# Patient Record
Sex: MALE | Race: WHITE | Employment: OTHER | URBAN - METROPOLITAN AREA
[De-identification: names, ages, dates, MRNs, and addresses within clinical notes are randomized per-mention and may not be internally consistent; named-entity substitution may affect disease eponyms.]

---

## 2021-10-01 ENCOUNTER — OFFICE VISIT (OUTPATIENT)
Dept: SURGERY | Age: 76
End: 2021-10-01
Payer: MEDICARE

## 2021-10-01 VITALS
OXYGEN SATURATION: 98 % | SYSTOLIC BLOOD PRESSURE: 136 MMHG | HEART RATE: 75 BPM | HEIGHT: 71 IN | TEMPERATURE: 97.3 F | DIASTOLIC BLOOD PRESSURE: 82 MMHG | WEIGHT: 213.5 LBS | BODY MASS INDEX: 29.89 KG/M2

## 2021-10-01 DIAGNOSIS — L02.91 ABSCESS: Primary | ICD-10-CM

## 2021-10-01 PROCEDURE — 10060 I&D ABSCESS SIMPLE/SINGLE: CPT | Performed by: PHYSICIAN ASSISTANT

## 2021-10-01 PROCEDURE — G8484 FLU IMMUNIZE NO ADMIN: HCPCS | Performed by: PHYSICIAN ASSISTANT

## 2021-10-01 PROCEDURE — G8417 CALC BMI ABV UP PARAM F/U: HCPCS | Performed by: PHYSICIAN ASSISTANT

## 2021-10-01 PROCEDURE — G8427 DOCREV CUR MEDS BY ELIG CLIN: HCPCS | Performed by: PHYSICIAN ASSISTANT

## 2021-10-01 PROCEDURE — 3017F COLORECTAL CA SCREEN DOC REV: CPT | Performed by: PHYSICIAN ASSISTANT

## 2021-10-01 PROCEDURE — 4040F PNEUMOC VAC/ADMIN/RCVD: CPT | Performed by: PHYSICIAN ASSISTANT

## 2021-10-01 PROCEDURE — 4004F PT TOBACCO SCREEN RCVD TLK: CPT | Performed by: PHYSICIAN ASSISTANT

## 2021-10-01 PROCEDURE — 1123F ACP DISCUSS/DSCN MKR DOCD: CPT | Performed by: PHYSICIAN ASSISTANT

## 2021-10-01 PROCEDURE — 99203 OFFICE O/P NEW LOW 30 MIN: CPT | Performed by: PHYSICIAN ASSISTANT

## 2021-10-01 RX ORDER — LIDOCAINE HYDROCHLORIDE AND EPINEPHRINE 10; 10 MG/ML; UG/ML
3 INJECTION, SOLUTION INFILTRATION; PERINEURAL ONCE
Status: COMPLETED | OUTPATIENT
Start: 2021-10-01 | End: 2021-10-01

## 2021-10-01 RX ORDER — SULFAMETHOXAZOLE AND TRIMETHOPRIM 800; 160 MG/1; MG/1
1 TABLET ORAL 2 TIMES DAILY
Qty: 20 TABLET | Refills: 0 | Status: SHIPPED
Start: 2021-10-01 | End: 2021-10-03 | Stop reason: ALTCHOICE

## 2021-10-01 RX ADMIN — LIDOCAINE HYDROCHLORIDE AND EPINEPHRINE 3 ML: 10; 10 INJECTION, SOLUTION INFILTRATION; PERINEURAL at 09:22

## 2021-10-01 NOTE — PROGRESS NOTES
Department of Plastic Surgery - Adult  Attending Consult Note      CHIEF COMPLAINT: Abscess left posterior neck    History Obtained From:  patient    HISTORY OF PRESENT ILLNESS:                The patient is a 77 y.o. male who presents with left posterior neck abscess. Patient states that he has had this mass for several weeks however became more painful and tender when he went to a provider in the state of [place] as he was [address]. This was 1 week prior where the abscess was incised and drained. He returned to the clinic at that time 2 days later where he was given Keflex for 7 days. He states he is just about finished with the antibiotics. He states that he believes he had the area cultured but did not have the results. He states that the abscess has reformed and is causing increased pain and tenderness.   He denies any recent discharge or drainage      Past Medical History:    Past Medical History:   Diagnosis Date    Diabetes mellitus ([address]     GERD (gastroesophageal reflux disease)     Hyperlipidemia     Hypertension     Kidney stones      Past Surgical History:    Past Surgical History:   Procedure Laterality Date    HERNIA REPAIR      x2    LITHOTRIPSY      OTHER SURGICAL HISTORY  09/18/2017    excisinal biopsy, right upper back subcutaneous mass    TONSILLECTOMY       Current Medications:      Current Outpatient Medications   Medication Sig Dispense Refill    sulfamethoxazole-trimethoprim (BACTRIM DS;SEPTRA DS) 800-160 MG per tablet Take 1 tablet by mouth 2 times daily for 10 days 20 tablet 0    ezetimibe (ZETIA) 10 MG tablet Take 10 mg by mouth daily      valsartan-hydrochlorothiazide (DIOVAN-HCT) 320-25 MG per tablet Take 1 tablet by mouth daily      FLUoxetine (PROZAC) 10 MG capsule Take 10 mg by mouth daily      glimepiride (AMARYL) 4 MG tablet Take 4 mg by mouth 2 times daily      metFORMIN (GLUCOPHAGE) 500 MG tablet Take 500 mg by mouth 2 times daily (with meals)      amLODIPine (NORVASC) 10 MG tablet Take 10 mg by mouth daily      spironolactone (ALDACTONE) 25 MG tablet Take 25 mg by mouth daily      pioglitazone (ACTOS) 30 MG tablet Take 30 mg by mouth daily      Ascorbic Acid (VITAMIN C) 500 MG tablet Take 500 mg by mouth daily       Current Facility-Administered Medications   Medication Dose Route Frequency Provider Last Rate Last Admin    lidocaine-EPINEPHrine 1 %-[id number] injection 3 mL  3 mL IntraDERmal Once MONO Collins         Allergies:  Prednisone    Social History:   Social History     Socioeconomic History    Marital status:      Spouse name: Not on file    Number of children: Not on file    Years of education: Not on file    Highest education level: Not on file   Occupational History    Not on file   Tobacco Use    Smoking status: Former Smoker     Years: 5.00     Types: Cigarettes    Smokeless tobacco: Never Used   Substance and Sexual Activity    Alcohol use: No    Drug use: No    Sexual activity: Not on file   Other Topics Concern    Not on file   Social History Narrative    Not on file     Social Determinants of Health     Financial Resource Strain:     Difficulty of Paying Living Expenses:    Food Insecurity:     Worried About Running Out of Food in the Last Year:     Ran Out of Food in the Last Year:    Transportation Needs:     Lack of Transportation (Medical):      Lack of Transportation (Non-Medical):    Physical Activity:     Days of Exercise per Week:     Minutes of Exercise per Session:    Stress:     Feeling of Stress :    Social Connections:     Frequency of Communication with Friends and Family:     Frequency of Social Gatherings with Friends and Family:     Attends [employer] Services:     Active Member of Clubs or Organizations:     Attends Club or Organization Meetings:     Marital Status:    Intimate Partner Violence:     Fear of Current or Ex-Partner:     Emotionally Abused:     Physically Abused:     Sexually Abused:      Family History:   No family history on file. REVIEW OF SYSTEMS:    CONSTITUTIONAL:  negative for  fevers, chills, sweats and fatigue  EYES: negative for dipolpia or acute vision loss. RESPIRATORY:  negative for  dry cough, cough with sputum, dyspnea, wheezing and chest pain  HENT:negative for pain, headache, difficulty swallowing or nose bleeds. CARDIOVASCULAR:  negative for  chest pain, dyspnea, palpitations, syncope  GASTROINTESTINAL:  negative for nausea, vomiting, change in bowel habits, diarrhea, constipation and abdominal pain  EXTREMITIES: negative for edema  MUSCULOSKELETAL: negative for muscle weakness  SKIN: positive for lesionnegative for itching or rashes. HEME: negative for easy brusing or bleeding  BEHAVIOR/PSYCH:  negative for poor appetite, increased appetite, decreased sleep and poor concentration      PHYSICAL EXAM:    VITALS:  /82 (Site: Right Upper Arm, Position: Sitting, Cuff Size: Medium Adult)   Pulse 75   Temp 97.3 °F (36.3 °C) (Temporal)   Ht 5' 11\" (1.803 m)   Wt 213 lb 8 oz (96.8 kg)   SpO2 98%   BMI 29.78 kg/m²   CONSTITUTIONAL:  awake, alert, cooperative, no apparent distress, and appears stated age  EYES: PERRLA, EOMI, no signs of occular infection  LUNGS:  No increased work of breathing, good air exchange, clear to auscultation bilaterally, no crackles or wheezing  CARDIOVASCULAR:  Normal apical impulse, regular rate and rhythm,   EXTREMITIES: no signs of clubbing or cyanosis. MUSCULOSKELETAL: negative for flaccid muscle tone or spastic movements. NEURO: Cranial nerves II-XII grossly intact. No signs of agitated mood. SKIN: Left posterior neck abscess-  40mm x  30mm x 5mm,    raised, no signs of bleeding,drainage or infection. tender to palpation.  nonMobile, subcutaneous, nonpulsitile    DATA:    Labs: CBC: No results found for: WBC, RBC, HGB, HCT, MCV, MCH, MCHC, RDW, PLT, MPV  BMP:    Lab Results   Component Value Date    GLUCOSE 158 09/18/2017       Radiology Review:  No radiology needed at this time    IMPRESSION/RECOMMENDATIONS:        Diagnosis  -) Left posterior neck subcutaneous abscess      After consent was obtained, the area was cleansed with betadine. Local anesthesia consisting of 1% lidocaine with 1:100,000 epinepherine was injected into the left posterior neck abscess. The local was allowed to work. The procedure was carried out. Using an 11 blade the abscess was opened and drained. A moderate amount of purulent drainage, approximately 2 cc was expressed, cultures were taken and sent for sensitivity. The wound was packed with a new gauze dressing and covered with 4 x 4 gauze pads and paper tape. The patient was informed to change this gauze pad as needed with saturation. They can shower over the wound site and pat dry with a clean paper towel. The patient's wife was shown how to change the packing however she does not believe she will be able to do this at home. The patient also states that he does not think his pain tolerance will allow this to be changed without local anesthesia. I informed him that they can leave the packing in place but I will need to see them on Monday in 3 days. Cultures obtained today  Bactrim sent to the pharmacy for 10 days.   Change gauze pad as needed with saturation    Photos obtained    Follow-up in 3 days    Bryan Chinchilla

## 2021-10-03 ENCOUNTER — HOSPITAL ENCOUNTER (EMERGENCY)
Age: 76
Discharge: HOME | End: 2021-10-03
Attending: EMERGENCY MEDICINE
Payer: MEDICARE

## 2021-10-03 ENCOUNTER — APPOINTMENT (OUTPATIENT)
Dept: GENERAL RADIOLOGY | Age: 76
End: 2021-10-03
Payer: MEDICARE

## 2021-10-03 VITALS
HEIGHT: 72 IN | BODY MASS INDEX: 29.39 KG/M2 | WEIGHT: 217 LBS | DIASTOLIC BLOOD PRESSURE: 81 MMHG | RESPIRATION RATE: 18 BRPM | TEMPERATURE: 98 F | OXYGEN SATURATION: 96 % | SYSTOLIC BLOOD PRESSURE: 129 MMHG | HEART RATE: 80 BPM

## 2021-10-03 DIAGNOSIS — L02.91 ABSCESS: Primary | ICD-10-CM

## 2021-10-03 DIAGNOSIS — R11.0 NAUSEA: ICD-10-CM

## 2021-10-03 LAB
ALBUMIN SERPL-MCNC: 4.1 G/DL (ref 3.5–5.2)
ALP BLD-CCNC: 59 U/L (ref 40–129)
ALT SERPL-CCNC: 29 U/L (ref 0–40)
ANION GAP SERPL CALCULATED.3IONS-SCNC: 16 MMOL/L (ref 7–16)
AST SERPL-CCNC: 36 U/L (ref 0–39)
BACTERIA: ABNORMAL /HPF
BASOPHILS ABSOLUTE: 0.08 E9/L (ref 0–0.2)
BASOPHILS RELATIVE PERCENT: 1 % (ref 0–2)
BILIRUB SERPL-MCNC: 0.7 MG/DL (ref 0–1.2)
BILIRUBIN URINE: NEGATIVE
BLOOD, URINE: NEGATIVE
BUN BLDV-MCNC: 13 MG/DL (ref 6–23)
CALCIUM SERPL-MCNC: 9 MG/DL (ref 8.6–10.2)
CHLORIDE BLD-SCNC: 99 MMOL/L (ref 98–107)
CLARITY: CLEAR
CO2: 21 MMOL/L (ref 22–29)
COLOR: YELLOW
CREAT SERPL-MCNC: 1.3 MG/DL (ref 0.7–1.2)
EOSINOPHILS ABSOLUTE: 0 E9/L (ref 0.05–0.5)
EOSINOPHILS RELATIVE PERCENT: 0 % (ref 0–6)
EPITHELIAL CELLS, UA: ABNORMAL /HPF
GFR AFRICAN AMERICAN: >60
GFR NON-AFRICAN AMERICAN: 54 ML/MIN/1.73
GLUCOSE BLD-MCNC: 274 MG/DL (ref 74–99)
GLUCOSE URINE: 500 MG/DL
HCT VFR BLD CALC: 41.6 % (ref 37–54)
HEMOGLOBIN: 13.9 G/DL (ref 12.5–16.5)
KETONES, URINE: >=80 MG/DL
LACTIC ACID, SEPSIS: 2.1 MMOL/L (ref 0.5–1.9)
LEUKOCYTE ESTERASE, URINE: NEGATIVE
LIPASE: 29 U/L (ref 13–60)
LYMPHOCYTES ABSOLUTE: 0.57 E9/L (ref 1.5–4)
LYMPHOCYTES RELATIVE PERCENT: 7 % (ref 20–42)
MCH RBC QN AUTO: 30 PG (ref 26–35)
MCHC RBC AUTO-ENTMCNC: 33.4 % (ref 32–34.5)
MCV RBC AUTO: 89.7 FL (ref 80–99.9)
METAMYELOCYTES RELATIVE PERCENT: 1 % (ref 0–1)
MONOCYTES ABSOLUTE: 0.32 E9/L (ref 0.1–0.95)
MONOCYTES RELATIVE PERCENT: 4 % (ref 2–12)
MYELOCYTE PERCENT: 1 % (ref 0–0)
NEUTROPHILS ABSOLUTE: 7.13 E9/L (ref 1.8–7.3)
NEUTROPHILS RELATIVE PERCENT: 86 % (ref 43–80)
NITRITE, URINE: NEGATIVE
PDW BLD-RTO: 12.5 FL (ref 11.5–15)
PH UA: 6 (ref 5–9)
PLATELET # BLD: 170 E9/L (ref 130–450)
PMV BLD AUTO: 10.5 FL (ref 7–12)
POTASSIUM REFLEX MAGNESIUM: 3.8 MMOL/L (ref 3.5–5)
PROTEIN UA: 30 MG/DL
RBC # BLD: 4.64 E12/L (ref 3.8–5.8)
RBC UA: ABNORMAL /HPF (ref 0–2)
SODIUM BLD-SCNC: 136 MMOL/L (ref 132–146)
SPECIFIC GRAVITY UA: >=1.03 (ref 1–1.03)
TOTAL PROTEIN: 7.1 G/DL (ref 6.4–8.3)
UROBILINOGEN, URINE: 0.2 E.U./DL
WBC # BLD: 8.1 E9/L (ref 4.5–11.5)
WBC UA: ABNORMAL /HPF (ref 0–5)

## 2021-10-03 PROCEDURE — U0003 INFECTIOUS AGENT DETECTION BY NUCLEIC ACID (DNA OR RNA); SEVERE ACUTE RESPIRATORY SYNDROME CORONAVIRUS 2 (SARS-COV-2) (CORONAVIRUS DISEASE [COVID-19]), AMPLIFIED PROBE TECHNIQUE, MAKING USE OF HIGH THROUGHPUT TECHNOLOGIES AS DESCRIBED BY CMS-2020-01-R: HCPCS

## 2021-10-03 PROCEDURE — 96374 THER/PROPH/DIAG INJ IV PUSH: CPT

## 2021-10-03 PROCEDURE — 71045 X-RAY EXAM CHEST 1 VIEW: CPT

## 2021-10-03 PROCEDURE — 93005 ELECTROCARDIOGRAM TRACING: CPT | Performed by: STUDENT IN AN ORGANIZED HEALTH CARE EDUCATION/TRAINING PROGRAM

## 2021-10-03 PROCEDURE — 99283 EMERGENCY DEPT VISIT LOW MDM: CPT

## 2021-10-03 PROCEDURE — 80053 COMPREHEN METABOLIC PANEL: CPT

## 2021-10-03 PROCEDURE — 81001 URINALYSIS AUTO W/SCOPE: CPT

## 2021-10-03 PROCEDURE — 83690 ASSAY OF LIPASE: CPT

## 2021-10-03 PROCEDURE — 85025 COMPLETE CBC W/AUTO DIFF WBC: CPT

## 2021-10-03 PROCEDURE — 83605 ASSAY OF LACTIC ACID: CPT

## 2021-10-03 PROCEDURE — 6360000002 HC RX W HCPCS: Performed by: STUDENT IN AN ORGANIZED HEALTH CARE EDUCATION/TRAINING PROGRAM

## 2021-10-03 PROCEDURE — 2580000003 HC RX 258: Performed by: STUDENT IN AN ORGANIZED HEALTH CARE EDUCATION/TRAINING PROGRAM

## 2021-10-03 PROCEDURE — 87040 BLOOD CULTURE FOR BACTERIA: CPT

## 2021-10-03 PROCEDURE — U0005 INFEC AGEN DETEC AMPLI PROBE: HCPCS

## 2021-10-03 RX ORDER — ONDANSETRON 4 MG/1
4 TABLET, ORALLY DISINTEGRATING ORAL 3 TIMES DAILY PRN
Qty: 21 TABLET | Refills: 0 | Status: SHIPPED | OUTPATIENT
Start: 2021-10-03

## 2021-10-03 RX ORDER — DOXYCYCLINE HYCLATE 100 MG
100 TABLET ORAL 2 TIMES DAILY
Qty: 14 TABLET | Refills: 0 | Status: SHIPPED | OUTPATIENT
Start: 2021-10-03 | End: 2021-10-10

## 2021-10-03 RX ORDER — ONDANSETRON 2 MG/ML
4 INJECTION INTRAMUSCULAR; INTRAVENOUS EVERY 6 HOURS PRN
Status: DISCONTINUED | OUTPATIENT
Start: 2021-10-03 | End: 2021-10-03 | Stop reason: HOSPADM

## 2021-10-03 RX ORDER — 0.9 % SODIUM CHLORIDE 0.9 %
1000 INTRAVENOUS SOLUTION INTRAVENOUS ONCE
Status: COMPLETED | OUTPATIENT
Start: 2021-10-03 | End: 2021-10-03

## 2021-10-03 RX ADMIN — SODIUM CHLORIDE 1000 ML: 9 INJECTION, SOLUTION INTRAVENOUS at 12:52

## 2021-10-03 RX ADMIN — ONDANSETRON 4 MG: 2 INJECTION INTRAMUSCULAR; INTRAVENOUS at 12:52

## 2021-10-03 ASSESSMENT — ENCOUNTER SYMPTOMS
SINUS PRESSURE: 0
EYE REDNESS: 0
NAUSEA: 1
CONSTIPATION: 0
CHEST TIGHTNESS: 0
SINUS PAIN: 0
COUGH: 0
SHORTNESS OF BREATH: 0
RHINORRHEA: 0
VOMITING: 0
DIARRHEA: 1
SORE THROAT: 0
ABDOMINAL PAIN: 1
EYE PAIN: 0

## 2021-10-03 ASSESSMENT — PAIN DESCRIPTION - DESCRIPTORS: DESCRIPTORS: ACHING

## 2021-10-03 ASSESSMENT — PAIN SCALES - GENERAL: PAINLEVEL_OUTOF10: 5

## 2021-10-03 NOTE — ED PROVIDER NOTES
[address] [facility]  Department of Emergency Medicine     Written by: Dominguez Sargent DO  Patient Name: Erasmo Diamond  Attending Provider: Olivier Castillo DO  Admit Date: 10/3/2021 12:08 PM  MRN: [id number]                   : 1945        Chief Complaint   Patient presents with   [facility] Fever     BODYACHES/ NAUSEA/ HAD LT NECK ABCESS X 2 WEEKS AGO ON ANTIBIOTIC AND HAS NOT BEEN WEEL SINCE    - Chief complaint    Mr. Marciano Hermosillo is a 76 yo male with a PMHx of HTN and DM2 who presents to the ED due to body aches, fever and nausea. Patient states that he developed a abscess behind his left eardrum roughly 2 weeks ago when he was on vacation in [address]. He went to his doctor and they attempted to drain it but were unable to express any pustulant fluid from the abscess location. They placed the patient on Keflex and he was taking that for the past few weeks until he was seen by his primary care physician 2 days ago. At that point they were able to drain pus from the abscess location and they switch his antibiotic to Bactrim in the morning. Shortly after starting Bactrim patient reported nausea, abdominal pain, anorexia, headache, chills and low-grade fever at home. He has been retching but has not vomited. Patient states he has not eaten or drink very much over the past 2 days. Patient denies any pain in the area of the abscess. He denies any aggravating or relieving factors. He states his symptoms have been the same since he started taking the Bactrim. He denies eating anything while taking the antibiotic. His wife states he has been malaised and fatigued for the past 2 days as well. Patient denies any chest pain, shortness of breath, lower extremity edema, headache, vision changes or any numbness or tingling. Review of Systems   Constitutional: Positive for activity change, appetite change, chills, diaphoresis, fatigue and fever.    HENT: Negative for congestion, rhinorrhea, sinus pressure, sinus pain, sneezing, sore throat and tinnitus. Eyes: Negative for pain, redness and visual disturbance. Respiratory: Negative for cough, chest tightness and shortness of breath. Cardiovascular: Negative for chest pain, palpitations and leg swelling. Gastrointestinal: Positive for abdominal pain, diarrhea and nausea. Negative for constipation and vomiting. Genitourinary: Negative for dysuria, flank pain, frequency, hematuria and urgency. Musculoskeletal: Negative for arthralgias, gait problem and joint swelling. Skin: Negative for rash. Neurological: Negative for dizziness, tremors, syncope, weakness, light-headedness, numbness and headaches. Physical Exam  Constitutional:       Appearance: Normal appearance. He is normal weight. HENT:      Head: Normocephalic and atraumatic. Comments: 1 cm open, clear abscess behind left ear      Right Ear: External ear normal.      Left Ear: External ear normal.      Mouth/Throat:      Mouth: Mucous membranes are moist.      Pharynx: Oropharynx is clear. Eyes:      Extraocular Movements: Extraocular movements intact. Conjunctiva/sclera: Conjunctivae normal.   Cardiovascular:      Rate and Rhythm: Normal rate and regular rhythm. Pulses: Normal pulses. Heart sounds: Normal heart sounds. Pulmonary:      Effort: Pulmonary effort is normal.      Breath sounds: Normal breath sounds. Abdominal:      General: Abdomen is flat. Bowel sounds are normal.      Palpations: Abdomen is soft. Tenderness: There is no abdominal tenderness. There is no guarding or rebound. Musculoskeletal:         General: Normal range of motion. Cervical back: Normal range of motion and neck supple. Skin:     General: Skin is warm and dry. Neurological:      General: No focal deficit present. Mental Status: He is alert and oriented to person, place, and time. Mental status is at baseline.    Psychiatric:         Mood and Affect: Mood normal.         Behavior: Behavior normal.          Procedures       MDM  Number of Diagnoses or Management Options  Abscess  Nausea  Diagnosis management comments: This is a 76 yo male with a PMHx of HTN and DM2 who presents to the ED due to body aches, fever and nausea. CBC was benign within normal limits. CMP revealed mildly decreased bicarbonate 21 with elevated glucose of 274 mildly elevated creatinine 1.3. His lipase is normal at 29. Analysis revealed 500 glucose, greater than 80 ketones, 30 protein, negative nitrite and leukocytes with few bacteria on microscopic urinalysis. Patient will be swabbed for send out Covid. Blood cultures were also obtained. Chest x-ray revealed no acute process. EKG showed normal sinus rhythm with possible left atrial enlargement with a rate of 92 without any acute ST or interval changes. Patient's wound was evaluated and unpacked with some further expression of pus like serosanguineous fluid. Patient received 1 L normal saline bolus and reported feeling much better on reevaluation. Patient's wound was repacked with sterile gauze and patient will follow up with his PCP tomorrow regarding his symptoms and for evaluation of his abscess. Patient was afebrile in the department with normal vitals. Patient was told to return to the ED if his symptoms return, worsen or change at any time. --------------------------------------------- PAST HISTORY ---------------------------------------------  Past Medical History:  has a past medical history of Diabetes mellitus ([facility] [address] GERD (gastroesophageal reflux disease), Hyperlipidemia, Hypertension, and Kidney stones. Past Surgical History:  has a past surgical history that includes hernia repair; Tonsillectomy; Lithotripsy; and other surgical history (09/18/2017). Social History:  reports that he has quit smoking. His smoking use included cigarettes. He quit after 5.00 years of use.  He has never used smokeless tobacco. He reports that he does not drink alcohol and does not use drugs. Family History: family history is not on file. The patients home medications have been reviewed.     Allergies: Prednisone    -------------------------------------------------- RESULTS -------------------------------------------------  Labs:  Results for orders placed or performed during the hospital encounter of 10/03/21   Lactate, Sepsis   Result Value Ref Range    Lactic Acid, Sepsis 2.1 (H) 0.5 - 1.9 mmol/L   CBC Auto Differential   Result Value Ref Range    WBC 8.1 4.5 - 11.5 E9/L    RBC 4.64 3.80 - 5.80 E12/L    Hemoglobin 13.9 12.5 - 16.5 g/dL    Hematocrit 41.6 37.0 - 54.0 %    MCV 89.7 80.0 - 99.9 fL    MCH 30.0 26.0 - 35.0 pg    MCHC 33.4 32.0 - 34.5 %    RDW 12.5 11.5 - 15.0 fL    Platelets 189 140 - 363 E9/L    MPV 10.5 7.0 - 12.0 fL    Neutrophils % 86.0 (H) 43.0 - 80.0 %    Lymphocytes % 7.0 (L) 20.0 - 42.0 %    Monocytes % 4.0 2.0 - 12.0 %    Eosinophils % 0.0 0.0 - 6.0 %    Basophils % 1.0 0.0 - 2.0 %    Neutrophils Absolute 7.13 1.80 - 7.30 E9/L    Lymphocytes Absolute 0.57 (L) 1.50 - 4.00 E9/L    Monocytes Absolute 0.32 0.10 - 0.95 E9/L    Eosinophils Absolute 0.00 (L) 0.05 - 0.50 E9/L    Basophils Absolute 0.08 0.00 - 0.20 E9/L    Metamyelocytes Relative 1.0 0.0 - 1.0 %    Myelocyte Percent 1.0 0 - 0 %   Comprehensive Metabolic Panel w/ Reflex to MG   Result Value Ref Range    Sodium 136 132 - 146 mmol/L    Potassium reflex Magnesium 3.8 3.5 - 5.0 mmol/L    Chloride 99 98 - 107 mmol/L    CO2 21 (L) 22 - 29 mmol/L    Anion Gap 16 7 - 16 mmol/L    Glucose 274 (H) 74 - 99 mg/dL    BUN 13 6 - 23 mg/dL    CREATININE 1.3 (H) 0.7 - 1.2 mg/dL    GFR Non-African American 54 >=60 mL/min/1.73    GFR African American >60     Calcium 9.0 8.6 - 10.2 mg/dL    Total Protein 7.1 6.4 - 8.3 g/dL    Albumin 4.1 3.5 - 5.2 g/dL    Total Bilirubin 0.7 0.0 - 1.2 mg/dL    Alkaline Phosphatase 59 40 - 129 U/L    ALT 29 0 - 40 U/L    AST 36 0 - 39 U/L   Lipase   Result Value Ref Range    Lipase 29 13 - 60 U/L   Urinalysis, reflex to microscopic   Result Value Ref Range    Color, UA Yellow Straw/Yellow    Clarity, UA Clear Clear    Glucose, Ur 500 (A) Negative mg/dL    Bilirubin Urine Negative Negative    Ketones, Urine >=80 (A) Negative mg/dL    Specific Gravity, UA >=1.030 1.005 - 1.030    Blood, Urine Negative Negative    pH, UA 6.0 5.0 - 9.0    Protein, UA 30 (A) Negative mg/dL    Urobilinogen, Urine 0.2 <2.0 E.U./dL    Nitrite, Urine Negative Negative    Leukocyte Esterase, Urine Negative Negative   EKG 12 Lead   Result Value Ref Range    Ventricular Rate 92 BPM    Atrial Rate 92 BPM    P-R Interval 152 ms    QRS Duration 102 ms    Q-T Interval 368 ms    QTc Calculation (Bazett) 455 ms    P Axis 49 degrees    R Axis -27 degrees    T Axis 22 degrees       Radiology:  XR CHEST PORTABLE   Final Result   No acute process. ------------------------- NURSING NOTES AND VITALS REVIEWED ---------------------------  Date / Time Roomed:  10/3/2021 12:08 PM  ED Bed Assignment:  17/17    The nursing notes within the ED encounter and vital signs as below have been reviewed. /80   Pulse 82   Temp 98 °F (36.7 °C)   Resp 20   Ht 6' (1.829 m)   Wt 217 lb (98.4 kg)   SpO2 95%   BMI 29.43 kg/m²   Oxygen Saturation Interpretation: Normal      ------------------------------------------ PROGRESS NOTES ------------------------------------------  3:44 PM EDT  I have spoken with the patient and discussed todays results, in addition to providing specific details for the plan of care and counseling regarding the diagnosis and prognosis. Their questions are answered at this time and they are agreeable with the plan. I discussed at length with them reasons for immediate return here for re evaluation. They will followup with their primary care physician by calling their office tomorrow.       --------------------------------- ADDITIONAL PROVIDER NOTES ---------------------------------  At this time the patient is without objective evidence of an acute process requiring hospitalization or inpatient management. They have remained hemodynamically stable throughout their entire ED visit and are stable for discharge with outpatient follow-up. The plan has been discussed in detail and they are aware of the specific conditions for emergent return, as well as the importance of follow-up. New Prescriptions    DOXYCYCLINE HYCLATE (VIBRA-TABS) 100 MG TABLET    Take 1 tablet by mouth 2 times daily for 7 days       Diagnosis:  1. Abscess    2. Nausea        Disposition:  Patient's disposition: Discharge to home  Patient's condition is stable. Patient was seen and evaluated by myself and my attending Ashish Martínez DO. Assessment and Plan discussed with attending provider, please see attestation for final plan of care.      DO Ting Nguyen DO  Resident  10/04/21 [id number]

## 2021-10-04 ENCOUNTER — OFFICE VISIT (OUTPATIENT)
Dept: SURGERY | Age: 76
End: 2021-10-04

## 2021-10-04 VITALS — RESPIRATION RATE: 20 BRPM | TEMPERATURE: 97.3 F | OXYGEN SATURATION: 95 % | HEART RATE: 90 BPM

## 2021-10-04 DIAGNOSIS — L02.91 ABSCESS: Primary | ICD-10-CM

## 2021-10-04 LAB
EKG ATRIAL RATE: 92 BPM
EKG P AXIS: 49 DEGREES
EKG P-R INTERVAL: 152 MS
EKG Q-T INTERVAL: 368 MS
EKG QRS DURATION: 102 MS
EKG QTC CALCULATION (BAZETT): 455 MS
EKG R AXIS: -27 DEGREES
EKG T AXIS: 22 DEGREES
EKG VENTRICULAR RATE: 92 BPM
SARS-COV-2, PCR: NOT DETECTED
WOUND/ABSCESS: NORMAL

## 2021-10-04 PROCEDURE — G8484 FLU IMMUNIZE NO ADMIN: HCPCS | Performed by: PHYSICIAN ASSISTANT

## 2021-10-04 PROCEDURE — G8417 CALC BMI ABV UP PARAM F/U: HCPCS | Performed by: PHYSICIAN ASSISTANT

## 2021-10-04 PROCEDURE — 1123F ACP DISCUSS/DSCN MKR DOCD: CPT | Performed by: PHYSICIAN ASSISTANT

## 2021-10-04 PROCEDURE — 3017F COLORECTAL CA SCREEN DOC REV: CPT | Performed by: PHYSICIAN ASSISTANT

## 2021-10-04 PROCEDURE — 4040F PNEUMOC VAC/ADMIN/RCVD: CPT | Performed by: PHYSICIAN ASSISTANT

## 2021-10-04 PROCEDURE — G8427 DOCREV CUR MEDS BY ELIG CLIN: HCPCS | Performed by: PHYSICIAN ASSISTANT

## 2021-10-04 PROCEDURE — 99024 POSTOP FOLLOW-UP VISIT: CPT | Performed by: PHYSICIAN ASSISTANT

## 2021-10-04 PROCEDURE — 1036F TOBACCO NON-USER: CPT | Performed by: PHYSICIAN ASSISTANT

## 2021-10-04 PROCEDURE — 93010 ELECTROCARDIOGRAM REPORT: CPT | Performed by: INTERNAL MEDICINE

## 2021-10-04 NOTE — PROGRESS NOTES
Subjective: Follow up today from incision and drainage of left posterior neck abscess 3 days prior. The patient was seen in the emergency department over the weekend where he was put on additional antibiotics as well. He was noted to have elevated blood glucose levels as well as bacteremia. Denies fever, nausea, vomiting, leg pain or swelling, pain is mild to the posterior neck. The patient was seen in the emergency department over the weekend for increasing pain to his left posterior neck. He states he was noted to have high blood glucose levels at that time. He admits to not taking his Metformin as directed and only checking his blood glucose levels once a week. He states he has been taking his antibiotics. He is not changing the packing of his left posterior neck at this time    Objective:    Vitals:    10/04/21 1334   Pulse: 90   Resp: 20   Temp: 97.3 °F (36.3 °C)   SpO2: 95%         Left posterior neck wound: Clean, dry, and intact, no drainage.   No signs of erythema or cellulitis wound os measures 5 mm x 2 mm tracking 5 mm no purulent drainage is noted      Assessment:    CBC:   Lab Results   Component Value Date    WBC 8.1 10/03/2021    RBC 4.64 10/03/2021    HGB 13.9 10/03/2021    HCT 41.6 10/03/2021    MCV 89.7 10/03/2021    MCH 30.0 10/03/2021    MCHC 33.4 10/03/2021    RDW 12.5 10/03/2021     10/03/2021    MPV 10.5 10/03/2021     BMP:    Lab Results   Component Value Date     10/03/2021    K 3.8 10/03/2021    CL 99 10/03/2021    CO2 21 10/03/2021    BUN 13 10/03/2021    LABALBU 4.1 10/03/2021    CREATININE 1.3 10/03/2021    CALCIUM 9.0 10/03/2021    GFRAA >60 10/03/2021    LABGLOM 54 10/03/2021    GLUCOSE 274 10/03/2021     Hepatic Function Panel:    Lab Results   Component Value Date    ALKPHOS 59 10/03/2021    ALT 29 10/03/2021    AST 36 10/03/2021    PROT 7.1 10/03/2021    BILITOT 0.7 10/03/2021    LABALBU 4.1 10/03/2021      Patient Active Problem List   Diagnosis    Mass of shoulder region     WBC 8.1  4.5 - 11.5 E9/L Final 10/03/2021 12:54 PM [address] [place] [address] Lab   RBC 4.64  3.80 - 5.80 E12/L Final 10/03/2021 12:54 PM [address] [place] [address] Lab   Hemoglobin 13.9  12.5 - 16.5 g/dL Final 10/03/2021 12:54 PM [address] [place] [address] Lab   Hematocrit 41.6  37.0 - 54.0 % Final 10/03/2021 12:54 PM  - [address]  [facility] [address] Lab   MCV 89.7  80.0 - 99.9 fL Final 10/03/2021 12:54 PM [address] [place] [address] Lab   MCH 30.0  26.0 - 35.0 pg Final 10/03/2021 12:54 PM [address] [place] [address] Lab   MCHC 33.4  32.0 - 34.5 % Final 10/03/2021 12:54 PM [address] [place] [address] Lab   RDW 12.5  11.5 - 15.0 fL Final 10/03/2021 12:54 PM [address] [place] [address] Lab   Platelets 594  447 - 450 E9/L Final 10/03/2021 12:54 PM [address] [place] [address] Lab   MPV 10.5  7.0 - 12.0 fL Final 10/03/2021 12:54 PM [address] [place] [address] Lab   Neutrophils % 86. 0High   43.0 - 80.0 % Final 10/03/2021 12:54 PM [address] [place] [address] Lab   Lymphocytes % 7. 0Low   20.0 - 42.0 % Final 10/03/2021 12:54 PM [address] [place] [address] Lab   Monocytes % 4.0  2.0 - 12.0 % Final 10/03/2021 12:54 PM [address] [place] [address] Lab   Eosinophils % 0.0  0.0 - 6.0 % Final 10/03/2021 12:54 PM [address] [place] [address] Lab   Basophils % 1.0  0.0 - 2.0 % Final 10/03/2021 12:54 PM [address] [place] [address] Lab   Neutrophils Absolute 7.13  1.80 - 7.30 E9/L Final 10/03/2021 12:54 PM [address] [place] [address] Lab   Lymphocytes Absolute 0.57Low   1.50 - 4.00 E9/L Final 10/03/2021 12:54 PM [address] [place] [address] Lab   Monocytes Absolute 0.32  0.10 - 0.95 E9/L Final 10/03/2021 12:54 PM [address] [place] [address] Lab   Eosinophils Absolute 0.00Low   0.05 - 0.50 E9/L Final 10/03/2021 12:54 PM [address] [place] [address] Lab   Basophils Absolute 0.08  0.00 - 0.20 E9/L Final 10/03/2021 12:54 PM [address] [place] [address] Lab   Metamyelocytes Relative 1.0  0.0 - 1.0 % Final 10/03/2021 12:54 PM  - [facility]   Myelocyte Percent 1.0  0 - 0 % Final 10/03/2021 12:54 PM [facility] - [address] Lab   Testing Performed By    Kiana5 Nilton Haider Name Director Address Valid Date Range   53- - [facility] Mercedez Feliz M.D. [facility], [address] [facility] [address] 09/14/21 1212-Present     Sodium 136  132 - 146 mmol/L Final 10/03/2021 12:54 PM [facility] [employer] Lab   Potassium reflex Magnesium 3.8  3.5 - 5.0 mmol/L Final 10/03/2021 12:54 PM [facility] [employer] [facility]   Chloride 99  98 - 107 mmol/L Final 10/03/2021 12:54 PM [facility] [employer] Lab   CO2 21Low   22 - 29 mmol/L Final 10/03/2021 12:54 PM [facility] [employer] [facility]   Anion Gap 16  7 - 16 mmol/L Final 10/03/2021 12:54 PM [facility] [employer] Lab   Glucose 274High   74 - 99 mg/dL Final 10/03/2021 12:54 PM [facility] [employer] Lab   BUN 13  6 - 23 mg/dL Final 10/03/2021 12:54 PM [address] 1.3High   0.7 - 1.2 mg/dL Final 10/03/2021 12:54 PM [facility] [employer] [facility]   GFR Non-African American 54  >=60 mL/min/1.73 Final 10/03/2021 12:54 PM [facility] [employer] [facility]   Chronic Kidney Disease: less than 60 ml/min/1.73 sq. m.         Kidney Failure: less than 15 ml/min/1.73 sq.m. Results valid for patients 18 years and older.     GFR  >60   Final 10/03/2021 12:54 PM [facility]   Calcium 9.0  8.6 - 10.2 mg/dL Final 10/03/2021 12:54 PM [facility] [employer] [facility]   Total Protein 7.1  6.4 - 8.3 g/dL Final 10/03/2021 12:54  [employer] Lab   Albumin 4.1  3.5 - 5.2 g/dL Final 10/03/2021 12:54 PM [facility]   Total Bilirubin 0.7  0.0 - 1.2 mg/dL Final 10/03/2021 12:54 PM [address] [place] [address] Lab   Alkaline Phosphatase 59  40 - 129 U/L Final 10/03/2021 12:54 PM [address] [place] [address] Lab   ALT 29  0 - 40 U/L Final 10/03/2021 12:54 PM [address] [place] [address] Lab   AST 36  0 - 39 U/L Final 10/03/2021 12:54 PM [facility]   Testing Performed By    Alirio Haider Name Director Address Valid Date Range   59-RU - [address] [facility] Kali Ashby M.D. [address] [address], [address] [place] [address] 09/14/21 1212-Present   Lab and Collection    Comprehensive Metabolic Panel w/ Reflex to MG - 10/3/2021  Result History    Comprehensive Metabolic Panel w/ Reflex to MG on 10/3/2021     WBC, UA 0-1  0 - 5 /HPF Final 10/03/2021 12:54 PM [address] [place] [address] Lab   RBC, UA 0-1  0 - 2 /HPF Final 10/03/2021 12:54 PM [address] [place] [address] Lab   Epithelial Cells, UA RARE  /HPF Final 10/03/2021 12:54 PM [address] [place] [address] Lab   Bacteria, UA FEWAbnormal   None Seen /HPF Final 10/03/2021 12:54 PM [address] [place] [address] Lab   Testing Performed By      Plan:     Left posterior neck abscess    I informed patient with his elevated blood glucose levels daily that he cannot drive safely with his complaints of feeling \"shaky\". Patient states that his wife will be driving. I informed him that he will need to contact his primary care physician to readjust his blood glucose medications and he will need to test his blood glucose levels daily as he states he only test them once a week he also states that he does not take his Metformin as prescribed. I educated the patient to continue his antibiotics to completion and continue with once daily packing of the left posterior neck wound site. I informed the patient that the abscess is no longer cellulitic or infected however he will need daily packing. I informed him that his blood glucose levels being so elevated will delay his wound healing. Advised patient he can shower over this at this time. Advised patient that once he gets to [facility] for his trip he will need to establish medical care for further assessment of not only his blood glucose levels but his left posterior neck wound.   The patient states that he has office visit with the [facility] in [facility]

## 2021-10-06 ENCOUNTER — TELEPHONE (OUTPATIENT)
Dept: SURGERY | Age: 76
End: 2021-10-06

## 2021-10-06 NOTE — TELEPHONE ENCOUNTER
Froylanrah Nicolas phoned from [place] stating he only has 2 more days of antibiotics left of his 7 days of antibiotics. His neck is draining yellow creamy liquid per his wife. The wife squeezed the area until she also got some blood. I advised him to continue antibiotics until done. Advised if fever, redness or warm to touch occurs to go to Emergency or urgent care. He will be in [place] until April on a mission for [employer] and can  not come in.  As I was speaking the patient hung up on me

## 2021-10-08 LAB
BLOOD CULTURE, ROUTINE: NORMAL
CULTURE, BLOOD 2: NORMAL

## 2021-10-11 NOTE — TELEPHONE ENCOUNTER
Noted, please inform the patient that if there is continued purulent drainage then he will require evaluation in the emergency department as there may need to be additional debridement and possibly antibiotics. Since he will not return  from [place] until April, establishing this care will be Necessary. This document is generated, in part, by voice recognition software and thus  syntax and grammatical errors are possible.     Camilla Cotto MD  8:39 AM  10/11/2021

## 2024-01-04 DIAGNOSIS — M25.562 LEFT KNEE PAIN, UNSPECIFIED CHRONICITY: Primary | ICD-10-CM

## 2024-01-09 ENCOUNTER — OFFICE VISIT (OUTPATIENT)
Dept: ORTHOPEDIC SURGERY | Age: 79
End: 2024-01-09

## 2024-01-09 VITALS — TEMPERATURE: 98 F | HEIGHT: 72 IN | BODY MASS INDEX: 29.39 KG/M2 | WEIGHT: 217 LBS

## 2024-01-09 DIAGNOSIS — M17.12 PRIMARY OSTEOARTHRITIS OF LEFT KNEE: Primary | ICD-10-CM

## 2024-01-09 NOTE — PROGRESS NOTES
Chief Complaint   Patient presents with    Knee Pain     Left knee pain for years, pain is on top of the knee around the medial side of the knee and underneath it.       Subjective:     Patient ID: Teodoro Scott is a 78 y.o..  male    Knee Pain  Patient complains of left knee pain. This is evaluated as a personal injury. There was not a history of injury.  The pain began several years ago. The pain is located medial, anterior. He describes  His symptoms as aching and sharp. He has experienced popping, clicking, locking, and giving way in the affected knee.  The patient has had pain with kneeling, squating, and climbing stairs.  Symptoms improve with rest. The symptoms are worse with activity, stair climbing, kneeling. The knee has given out or felt unstable. The patient can bend and straighten the knee fully.  The patient is active in none. Treatment to date has been ice, Voltaren gel, without significant relief. The patient is not working.     Past Medical History:   Diagnosis Date    Diabetes mellitus (HCC)     GERD (gastroesophageal reflux disease)     Hyperlipidemia     Hypertension     Kidney stones      Past Surgical History:   Procedure Laterality Date    HERNIA REPAIR      x2    LITHOTRIPSY      OTHER SURGICAL HISTORY  09/18/2017    excisinal biopsy, right upper back subcutaneous mass    TONSILLECTOMY         Current Outpatient Medications:     ondansetron (ZOFRAN-ODT) 4 MG disintegrating tablet, Take 1 tablet by mouth 3 times daily as needed for Nausea or Vomiting, Disp: 21 tablet, Rfl: 0    ezetimibe (ZETIA) 10 MG tablet, Take 1 tablet by mouth daily, Disp: , Rfl:     valsartan-hydrochlorothiazide (DIOVAN-HCT) 320-25 MG per tablet, Take 1 tablet by mouth daily, Disp: , Rfl:     FLUoxetine (PROZAC) 10 MG capsule, Take 1 capsule by mouth daily, Disp: , Rfl:     glimepiride (AMARYL) 4 MG tablet, Take 1 tablet by mouth 2 times daily, Disp: , Rfl:     metFORMIN (GLUCOPHAGE) 500 MG tablet, Take 1

## 2024-04-29 ENCOUNTER — OFFICE VISIT (OUTPATIENT)
Dept: ORTHOPEDIC SURGERY | Age: 79
End: 2024-04-29
Payer: MEDICARE

## 2024-04-29 VITALS — TEMPERATURE: 98 F | BODY MASS INDEX: 29.39 KG/M2 | WEIGHT: 217 LBS | HEIGHT: 72 IN

## 2024-04-29 DIAGNOSIS — M17.12 PRIMARY OSTEOARTHRITIS OF LEFT KNEE: Primary | ICD-10-CM

## 2024-04-29 PROCEDURE — 20610 DRAIN/INJ JOINT/BURSA W/O US: CPT

## 2024-04-29 RX ORDER — TRIAMCINOLONE ACETONIDE 40 MG/ML
40 INJECTION, SUSPENSION INTRA-ARTICULAR; INTRAMUSCULAR ONCE
Status: COMPLETED | OUTPATIENT
Start: 2024-04-29 | End: 2024-04-29

## 2024-04-29 RX ADMIN — TRIAMCINOLONE ACETONIDE 40 MG: 40 INJECTION, SUSPENSION INTRA-ARTICULAR; INTRAMUSCULAR at 09:12

## 2024-04-29 NOTE — PROGRESS NOTES
instability, there is not  deformity noted.    Knee exam: bilateral positive for moderate crepitations, some mild tenderness laxity is not noted with stress.  There is not a popliteal cyst.    R. Knee:  Lachman's negative, Anterior Drawer negative, Posterior Drawer negative  Poli's negative, Thallasy  negative,   PF grind test negative, Apprehension test negative, Patellar J sign  negative  L. Knee:  Lachman's negative, Anterior Drawer negative, Posterior Drawer negative  Poli's negative, Thallasy  negative,   PF grind test negative, Apprehension test negative,  Patellar J sign  negative    Xray Exam:  Severe medial compartment djd left knee  Radiographic findings reviewed with patient    Assessment:  Encounter Diagnoses   Name Primary?    Primary osteoarthritis of left knee Yes         Plan:  Natural history and expected course discussed. Questions answered.  Educational materials distributed.  Rest, ice, compression, and elevation (RICE) therapy.  Reduction in offending activity.  Patellar compression sleeve.  OTC analgesics as needed.     I will proceed with a cortisone injection in the Left knee.  Verbal and written consent was obtained for the injections. The skin was prepped with alcohol. 1mL of Kenalog 40mg and 9mL of 0.25% Marcaine was  injected to Left knee. The injection was given through the lateral side of the knee. The patient tolerated the injection well. I will see the patient back 3 months.